# Patient Record
(demographics unavailable — no encounter records)

---

## 2019-01-29 NOTE — HEMODYNAMI
PATIENT:SAUL LOPEZ                                MEDICAL RECORD: [id number]
: 50                                            LOCATION:D[facility]CAT          
ACCT# [id number]                                       ADMISSION DATE: 19
 
 
 Generatedon:201916:23
Patient name: SAUL LOPEZ Patient #: [id number] Visit #: [id number] SSN:
 : 1950
Date of study: 2019
Page: Of
Hemodynamic Procedure Report
****************************
Patient Data
Patient Demographics
Procedure consent was obtained
First Name: SAUL         Gender: Male
Last Name: JOHN        : 1950
Patient #: [id number]       Age: 68 year(s)
Visit #: [id number]       Race: Unknown
Accession #:
[id number]
Additional ID: [id number]
Contact details
Address: [place] [address]         Phone: [phone]
State: [place]
City: [place]
Zip code: [address]
Past Medical History
Allergies
Allergen        Reaction        Date         Comments
Reported
Other allergy                   2019    PCN, MOLD
Admission
Admission Data
Admission Date: 2019   Admission Time: 11:51
Admit Source: Other
Procedure
Procedure Types
Cath Procedure
Diagnostic Procedure
LHC
LHC w/Coronaries
Procedure Description
Procedure Date
Procedure Date: 2019
Procedure Start Time: 16:08
Procedure End Time: 16:19
Procedure Staff
Name                            Function
Broderick Roth MD                   Performing Physician
Jackie Garcia RT               Monitor
John Jose RN                  Nurse
Nikolai Daniel RT                     Scrub
Procedure Data
Cath Procedure
Fluoroscopy
Diagnostic fluoroscopy      Total fluoroscopy Time: 1.7
time: 1.7 min               min
Diagnostic fluoroscopy      Total fluoroscopy dose:
 
dose: 1079 mGy              1079 mGy
Contrast Material
Contrast Material Type                       Amount (ml)
Isovue 300                                   71
Entry Location
Entry     Primary  Successful  Side  Size  Upsize Upsize Entry    Closure     Blanco
ccessful  Closure
Location                             (Fr)  1 (Fr) 2 (Fr) Remarks  Device        
          Remarks
Radial                         Right 6 Fr                         Mechanical
artery                               Short                        Compression
Estimated blood loss: 5 ml
Diagnostic catheters
Device Type               Used For           End Catheter
Placement
DIAGNOSTIC Yuriy 110cm
5Fr catheter ([id number])
Procedure Complications
No complications
Procedure Medications
Medication           Administration Route Dosage
Oxygen               etCO2 Nasal cannula  2 l/min
Lidocaine 2%         added to field       20
Heparin Flush Bag    added to field       2 bags
(1000units/500ml NS)
0.9% NaCl            I.V.                 100 ml/hr
Radial Cocktail      I.A.                 1 syringe
(Verapomil 2mg/Nitro
400mcg/Heparin
1500units)
Versed               I.V.                 1 mg
Fentanyl             I.V.                 50 mcg
Versed               I.V.                 1 mg
Fentanyl             I.V.                 50 mcg
Versed               I.V.                 0.5 mg
Fentanyl             I.V.                 25 mcg
Hemodynamics
Rest
Heart Rate: 76 (bpm)
Pressure Samples
Time     Site     Value (mmHg) Purpose      Heart      Use
Rate(bpm)
16:10    LV       83/13,12     Snapshot     65
16:10    LV       141/16,23    Snapshot     66
16:11    AO       123/72(94)   Pullback     61
16:11    LV       129/3,12     Pullback     61
Gradients
Valve  Time  Site 1   Site 2     Mean    SEP/DFP    Peak To Heart  Use
(mmHg)  (sec/min)  Peak    Rate
(mmHg)  (bpm)
Aortic 16:11 LV       AO         11      6          6       61
129/3,12 123/72(94)
Calculations
Valve    P-P      Mean      Valve     Index  Valve    Source
Name              Gradient  Area             Flow
(cm2)
Aortic   6        11
6        11
Snapshots
Pre Cath      Intra         NCS           Post Cath
 
Vital Signs
Time     Heart  Resp   SPO2 etCO2   NIBP (mmHg)  Rhythm  Pain    Sedation
Rate   (ipm)  (%)  (mmHg)                       Status  Level
(bpm)
15:57:18 53     14     98   0       158/89(129)  NSR     0 (11)  10(A)
, No
pain
16:02:09 51     14     98   28.6    163/95(128)  NSR     0 (11)  10(A)
, No
pain
16:06:23 60     13     99   35.4    147/127(135) NSR     0 (11)  10(A)
, No
pain
16:10:33 67     14     97   0       160/91(130)  NSR     0 (11)  9(A)
, No
pain
16:14:47 62     12     96   19.6    158/92(116)  NSR     0 (11)  9(A)
, No
pain
16:19:01 56     15     98   7.5     160/85(117)  NSR     0 (11)  10(A)
, No
pain
Medications
Time     Medication       Route   Dose    Verified Delivered Reason       Notes 
 Effectiveness
by       by
16:01:57 Oxygen           etCO2   2 l/min Broderick     John    used for
Nasal           Gonzalez Jose RN   procedure
cannula
16:02:07 Lidocaine 2%     added   20ml    Broderick     Broderick      for local
to      vial    Gonzalez Roth MD  anesthetic
field
16:02:13 Heparin Flush    added   2 bags  Broderick     Broderick      used for
Bag              to              Gonzalez Roth MD  procedure
(1000units/500ml field
NS)
16:02:22 0.9% NaCl        I.V.    100     Broderick     Buffie    Per
ml/hr   Gonzalez Jose RN   physician
16:04:11 Versed           I.V.    1 mg    Broderick     Buffie    for sedation
Gonzalez Jose RN
16:04:15 Fentanyl         I.V.    50 mcg  Broderick     Buffie    for sedation
Gonzalez Jose RN
16:09:52 Radial Cocktail  I.A.    1       Broderick     Broderick      for
(Verapomil               syringe Gonzalez Roth MD  vasodilation
2mg/Nitro
400mcg/Heparin
1500units)
16:10:01 Versed           I.V.    1 mg    Broderick     Buffie    for sedation
Gonzalez Jose RN
16:10:06 Fentanyl         I.V.    50 mcg  Broderick     Buffie    for sedation
Gonzalez Jose RN
16:14:19 Versed           I.V.    0.5 mg  Broderick     Buffie    for sedation
Gonzalez Jose RN
16:14:23 Fentanyl         I.V.    25 mcg  Broderick     Buffie    for sedation
Gonzalez Jose RN
Procedure Log
Time     Note
15:47:02 Admit Source: Other
15:47:25 Diagnostic Cath status Elective
15:47:31 John Jose RN sent for patient. Start room use.
 
15:47:52 Time tracking: Regular hours (M-F 7:00 - 5:00)
15:47:56 Plan of Care:Hemodynamics will remain stable., Cardiac
rhythm will remain stable., Comfort level will be
maintained., Respiratory function will remain
adequate., Patient/ family verbilizes understanding of
procedure., Procedure tolerated without complication.,
Recovers from procedure without complications..
15:55:55 Patient received from Pre/Post Procedure Room to CCL 1
Alert and oriented. Tansferred to table in Supine
position.
15:55:57 Warm blankets applied, and janes hugger turned on for
patient comfort.
15:55:57 Correct patient and procedure confirmed by team.
15:55:58 Signed procedure consent form obtained from patient.
15:56:00 ECG and BP/O2 sat monitors applied to patient.
15:56:01 Vital chart was started
15:56:02 Baseline sample Acquired.
15:56:05 Rhythm: sinus rhythm
15:56:06 Full Disclosure recording started
15:56:56 Baseline sample Acquired.
15:56:59 Baseline sample Acquired.
16:01:57 Oxygen 2 l/min etCO2 Nasal cannula was administered by
John Jose RN; used for procedure;
16:02:07 Lidocaine 2% 20ml vial added to field was administered
by Broderick Roth MD; for local anesthetic;
16:02:13 Heparin Flush Bag (1000units/500ml NS) 2 bags added to
field was administered by Broderick Roth MD; used for
procedure;
16:02:18 Baseline sample Acquired.
16:02:22 0.9% NaCl 100 ml/hr I.V. was administered by John Jose RN; Per physician;
16:02:35 H&P Date Dictated: 2019 Within 30 days and on
chart., H&P Addendum completed by physician on day of
procedure. (MUST COMPLETE FOR ALL OUTPATIENTS).
16:02:37 Family in patients room.
16:02:39 Patient NPO since Midnight.
16:02:48 Patient allergic to Other allergyPCN, MOLD
16:02:51 Patient diabetic? No.
16:02:53 Is patient on blood thinner?No
16:02:55 Previous problem with sedation/anesthesia? No ?
16:02:57 Snore? Yes
16:02:58 Sleep apnea? No
16:02:59 Deviated septum? No
16:03:00 Opens mouth fully? Yes
16:03:01 Sticks out tongue? Yes
16:03:03 Airway obstruction? No ?
16:03:06 Dentures? Yes IN TIGHT
16:03:10 Modified Yang's test Ulnar < 7 seconds
16:03:12 Patient pain scale 0/10 ?.
16:03:16 IV patent on arrival in left hand with 0.9% NaCl at
[facility].
16:03:19 Lab results completed and on chart.
16:03:23 Right Radial & Right Groin area was prepped with
chlora-prep and draped in sterile fashion
16:03:23 Alarms reviewed by R. N.
16:03:24 Sharps counted by scrub and verified by R.N.
16:03:26 Use device set Radial Dx or PCI
16:03:27 ACIST Syringe (12081) opened to sterile field.
16:03:28 Bag Decanter (S) opened to sterile field.
16:03:29 ACIST Hand Control (89313) opened to sterile field.
 
16:03:29 ACIST Manifold (62139) opened to sterile field.
16:03:30 Tegaderm 4 x 4 (1626W) opened to sterile field.
16:03:32 Medline Cath Pack ([id number]) opened to sterile field.
16:03:32 DIAGNOSTIC WIRE .035 260cm J wire (093543) opened to
sterile field.
16:03:32 MBrace Wrist Support ([id number]) opened to sterile
field.
16:03:33 SHEATH 6FR Slender () opened to sterile field.
16:03:35 NEEDLE Cook 21G 4cm Radial ([id number]) opened to sterile
field.
16:03:40 --------ALL STOP TIME OUT------
16:03:40 Final Timeout: patient, procedure, and site verified
with staff and physician. All members of the team are
in agreement.
16:03:42 Right Radial & Right Groin site verified by team.
16:03:44 Physical assessment completed. ASA score P 2 - A
patient with mild systemic disease as per Broderick Roth MD.
16:03:48 Sedation plan: IV Moderate Sedation Medication:Versed,
Fentanyl
16:04:11 Versed 1 mg I.V. was administered by John Jose RN;
for sedation;
16:04:15 Fentanyl 50 mcg I.V. was administered by John Jose
RN; for sedation;
16:07:48 Zero performed for pressure channel P1
16:07:51 Procedure started.
16:08:24 Zero performed for pressure channel P1
16:08:37 Zero performed for pressure channel P1
16:08:46 Local anesthetic to right radial artery with Lidocaine
2% by Broderick Roth MD.**INITIAL ACCESS ONLY**
16:09:17 A 6 Fr Short sheath was inserted into the Right Radial
artery
16:09:31 A DIAGNOSTIC Yuriy 110cm 5Fr catheter ([id number]) was
advanced over the wire and used for .
16:09:52 Radial Cocktail (Verapomil 2mg/Nitro 400mcg/Heparin
1500units) 1 syringe I.A. was administered by Broderick Roth MD; for vasodilation;
16:10:01 Versed 1 mg I.V. was administered by John Jose RN;
for sedation;
16:10:06 Fentanyl 50 mcg I.V. was administered by John Jose
RN; for sedation;
16:10:07 LV gram done using STINSON
16:10:11 Injector settings: Ml/sec: 5, Volume: 15,
16:10:50 LV hemodynamics recorded.
16:10:59 EF : 50 %
16:13:32 LCA angiography performed.
16:14:13 RCA angiography performed.
16:14:19 Versed 0.5 mg I.V. was administered by John Jose RN;
for sedation;
16:14:23 Fentanyl 25 mcg I.V. was administered by John Jose
RN; for sedation;
16:16:30 Catheter exchanged over wire.
16:16:36 TR BAND Large ([id number]) opened to sterile field.
16:16:59 Procedure ended.(Physican Out)
16:17:13 Sheath removed intact; hemostasis achieved with
Mechanical Compression to the Right Radial artery.
16:17:33 Fluoroscopy time 01.70 minutes.
16:17:38 Fluoroscopy dose: 1079 mGy
16:17:38 Flurop Dose total: 1079
16:17:42 Contrast amount:Isovue 300 71ml.
 
16:17:43 Sharps counted by scrub and verified by R.N.
16:17:46 TR band inflated with 11cc of air.
16:17:59 Post-procedure physical assessment completed. ASA
score P 2 - A patient with mild systemic disease as
per Broderick Roth MD.
16:18:05 Post procedure rhythm: sinus bradycardia
16:18:08 Estimated blood loss: 5 ml
16:18:09 Post procedure instruction explained to
patient.Patient verbalizes understanding.
16:18:10 Patient needs reinforcement of post procedure
teaching.
16:19:34 Procedure and supply charges have been captured,
reviewed, submitted and are correct.
16:19:37 Procedure Complication : No complications
16:19:38 Vital chart was stopped
16:19:39 See physician's report for complete and final results.
16:19:41 Report given to Pre/Post Procedure Room.
16:19:47 Patient transfered to Pre/Post Procedure Room with
Bed.
16:19:49 Procedure ended.
16:19:49 Full Disclosure recording stopped
16:19:53 End room use (Document Last)
Device Usage
Item Name   Manufacture  Quantity  Catalog      Hospital Part    Current Minimal
 Lot# /
Number       Charge   Number  Stock   Stock   Serial#
Code
ACIST       Acist        1         96390        658550   609849  378773  20
Syringe     Medical
(75604)     Systems Inc
Bag         Microtek     1         S        898029   94377   061584  5
Decanter    Medical Inc.
()
ACIST Hand  Acist        1         90410        214006   882889  626417  5
Control     Medical
(29468)     Systems Inc
ACIST       Acist        1         37904        501580   212928  451704  5
Manifold    Medical
(81655)     Systems Inc
Tegaderm 4  3M           1         1626W        774885   783353  130184  5
x 4 (1626W)
Medline     Medline      1         [id number]    488166   49264   253769  5
Cath Pack
([id number])
DIAGNOSTIC  St Eliazar      1         868820       508153   160634  453225  30
WIRE .035
260cm J
wire
(123403)
MBrace      Advanced     1         140-0250-00  761400   82527   706308  5
Wrist       Vascular
Support     Dynamics
([id number])
SHEATH 6FR  Terumo       1         [id number]   419492   868725  209141  5
Slender
()
NEEDLE Cook Cook Medical 1         E16650       570491   573221  042317  5
21G 4cm
Radial
(Y40237)
 
DIAGNOSTIC  Terumo       1               837845   553522  816207  5
Yuriy 110cm
5Fr
catheter
(359364)
TR BAND     Terumo       1         [id number]    787531   224731  158990  40
Large
([id number])
Signature Audit Wallace
Stage           Time        Signature      Unsigned
Intra-Procedure 2019   Jackie Garcia
4:22:56 PM  RT(R)
Signatures
Monitor : Jackie Garcia Signature :
RT                       ______________________________
Date : ______________ Time :
______________
 
 
 
 
 
 
 
 
 
 
 
 
 
 
 
 
 
 
 
 
 
 
 
 
 
 
 
 
 
 
 
 
 
 
 
 
 
 
[place] [phone]

## 2019-01-29 NOTE — NUR
4 CC AIR REMOVED FROM TR BAND, SMALL AMT OF BLEEDING NOTED PLACED 2CC
AIR BACK IN.  PT GIVEN SANDWICH PER REQUEST.  DENIES CHEST PAIN OR
ANY OTHER NEEDS.  CALL LIGHT IN REACH, WIFE AT BEDSIDE.

## 2019-01-29 NOTE — NUR
TR BAND AND REMAINING AIR REMOVED. NO BLEEDING OR SWELLING NOTED.
2X2 AND TEGADERM DRESSING APPLIED.
 
PT DISCHARGED VIA WC TO PRIVATE VEHICLE.

## 2019-01-29 NOTE — NUR
TR BAND IN PLACE, DRESSING REMAINS CDI NO BLEEDING OR SWELLING NOTED.
 PT SLEEPING QUIETLY. CALL LIGHT IN REACH

## 2019-01-29 NOTE — NUR
4 CC AIR REMOVED FROM TRB NO BLEEDING OR SWELLING NOTED.  VOIDED IN
URINAL.  DENIES PAIN OR ADDITIONAL NEEDS.

## 2019-01-29 NOTE — NUR
DISCHARGE TEACHING COMPLETED W PT AND WIFE, VERBALIZED
UNDERSTANDING. IV REMOVED W CATH INTACT, MONITORS AND O2 REMOVED.  PT
UP TO DRESS FOR DISCHARGE.

## 2019-01-29 NOTE — NUR
PT RECEIVED VIA STRECTHER FROM CATH LAB FOR RECOVERY.  PT AWAKE BUT
DROWSY.  DENIES CHEST PAIN OR NAUSEA.  HR SINUS TAB RATE 58, BP
135/79, O2 SAT 98 ON 2L/NC.  TR BAND TO R WRIST, DRESSING CDI NO
BLEEDING OR SWELLING NOTED.  CALL LIGHT IN REACH, WIFE AT BEDSIDE.

## 2019-01-29 NOTE — NUR
PT RESTING QUIETLY, DENIES NEEDS.  TR BAND IN PLACE, NO BLEEDING OR
HEMATOMA NOTED.  HR 56, /80.  CALL LIGHT IN REACH.